# Patient Record
Sex: MALE | Race: WHITE | NOT HISPANIC OR LATINO | Employment: FULL TIME | URBAN - NONMETROPOLITAN AREA
[De-identification: names, ages, dates, MRNs, and addresses within clinical notes are randomized per-mention and may not be internally consistent; named-entity substitution may affect disease eponyms.]

---

## 2024-12-11 ENCOUNTER — OFFICE VISIT (OUTPATIENT)
Dept: NEUROLOGY | Facility: CLINIC | Age: 49
End: 2024-12-11
Payer: MEDICAID

## 2024-12-11 VITALS — BODY MASS INDEX: 33.53 KG/M2 | WEIGHT: 247.2 LBS

## 2024-12-11 DIAGNOSIS — H83.2X3 VESTIBULAR HYPOFUNCTION OF BOTH EARS: Primary | ICD-10-CM

## 2024-12-11 PROCEDURE — 99215 OFFICE O/P EST HI 40 MIN: CPT | Performed by: STUDENT IN AN ORGANIZED HEALTH CARE EDUCATION/TRAINING PROGRAM

## 2024-12-11 NOTE — PROGRESS NOTES
"Subjective     Alirio Cesar is a 49 y.o. year old male here for dizziness    HPI  Please see my old note for more details. Mr. Cesar has returned for a follow-up after six years regarding his vertigo and difficulty focusing. Recently, he has experienced several new symptoms, oscillopsia is much worse. He does not perceive double vision, he suspects the images might be overlapping, but not always, when he moves head its ok..  Mr. Cesar was initially referred by Dr. Carlos and Shaila Anthony due to a possible central cause of his dizziness. An EMP indicated a central problem, and an ENG revealed bilateral weakness. He described feeling as though he were drunk, with his surroundings moving as if he were a \"bobble head,\" and he had difficulty reading.   Driving is ok in general except there is a bouncy ride.    Review of Systems as noted in HPI    There is no problem list on file for this patient.    Past Medical History:   Diagnosis Date    Fibromyalgia 01/06/2020    Fibromyalgia    Personal history of diseases of the skin and subcutaneous tissue 11/20/2018    History of folliculitis    Personal history of other diseases of the digestive system 01/06/2020    History of small bowel obstruction    Personal history of other infectious and parasitic diseases 08/07/2018    History of Clostridium difficile infection    Personal history of other mental and behavioral disorders     History of depression     Past Surgical History:   Procedure Laterality Date    OTHER SURGICAL HISTORY  01/06/2020    Appendectomy     Social History     Tobacco Use    Smoking status: Not on file    Smokeless tobacco: Not on file   Substance Use Topics    Alcohol use: Not on file     family history is not on file.  No current outpatient medications on file.  No Known Allergies    Objective   Alert, oriented x4, EOM full, no nystagmus on straight or eccentric gaze. No head position dependent nystagmus. There is VOR hypofunction and it is " bilateral on horizontal head impulses. Pupils reactive and VF full.  Face symmetric, face sensations intact, hearing is intact bilaterally.  Power intact in all four extremities, proximal and distal.  Sensations intact bilaterally in UE and LE proximal and distal, with touch, tactile, and sharps, JPS unremarkable.  Gait requires a stick for assist, its cautious, but not ataxia.  No tremor, no increase in tone.  Assessment/Plan   The patient is a 49 year old man with history of vestibular hypofunction (bilateral), here for follow up. Now hypofunction is more consisten on head impulses. He is inquiring about vestibular prosthesis, I will help him inquire with our colleagues in [place].  Will follow through in 3 months.

## 2025-01-08 ENCOUNTER — APPOINTMENT (OUTPATIENT)
Dept: NEUROLOGY | Facility: CLINIC | Age: 50
End: 2025-01-08

## 2025-04-09 ENCOUNTER — APPOINTMENT (OUTPATIENT)
Dept: NEUROLOGY | Facility: CLINIC | Age: 50
End: 2025-04-09
Payer: MEDICAID

## 2025-04-09 VITALS
SYSTOLIC BLOOD PRESSURE: 155 MMHG | BODY MASS INDEX: 33.58 KG/M2 | WEIGHT: 247.6 LBS | TEMPERATURE: 97.9 F | DIASTOLIC BLOOD PRESSURE: 98 MMHG | HEART RATE: 70 BPM | RESPIRATION RATE: 16 BRPM

## 2025-04-09 DIAGNOSIS — H83.2X3 VESTIBULAR HYPOFUNCTION OF BOTH EARS: Primary | ICD-10-CM

## 2025-04-09 PROCEDURE — 99215 OFFICE O/P EST HI 40 MIN: CPT | Performed by: STUDENT IN AN ORGANIZED HEALTH CARE EDUCATION/TRAINING PROGRAM

## 2025-04-09 RX ORDER — BUSPIRONE HYDROCHLORIDE 15 MG/1
15 TABLET ORAL NIGHTLY
COMMUNITY

## 2025-04-09 RX ORDER — PAROXETINE 10 MG/1
10 TABLET, FILM COATED ORAL DAILY
COMMUNITY

## 2025-04-09 RX ORDER — LOSARTAN POTASSIUM 50 MG/1
50 TABLET ORAL
COMMUNITY
Start: 2025-03-25 | End: 2026-03-25

## 2025-04-09 RX ORDER — SILDENAFIL CITRATE 20 MG/1
40 TABLET ORAL AS NEEDED
COMMUNITY
Start: 2025-01-15

## 2025-04-09 RX ORDER — AMLODIPINE BESYLATE 5 MG/1
TABLET ORAL
COMMUNITY

## 2025-04-09 RX ORDER — LANOLIN ALCOHOL/MO/W.PET/CERES
1000 CREAM (GRAM) TOPICAL
COMMUNITY
Start: 2025-03-25 | End: 2026-03-25

## 2025-04-09 RX ORDER — CHOLECALCIFEROL (VITAMIN D3) 50 MCG
2000 TABLET ORAL
COMMUNITY
Start: 2025-03-25

## 2025-04-09 RX ORDER — FLUTICASONE PROPIONATE 50 MCG
1 SPRAY, SUSPENSION (ML) NASAL 2 TIMES DAILY
COMMUNITY

## 2025-04-09 ASSESSMENT — PAIN SCALES - GENERAL: PAINLEVEL_OUTOF10: 0-NO PAIN

## 2025-04-09 NOTE — PROGRESS NOTES
"Subjective     Alirio Cesar is a 50 y.o. year old male here for dizziness    HPI  Interval Hx  Ambulates with cane when inside building or in the dark. Otherwise does not need a cane. States symptoms have not change since last visit. Had PT years ago and reports it does not work for him. Reports he is here to find out if Dr. Sanders was bale to reach out to providers that can do the vestibular implants.     Endorses anxiety and depression and is on medication for those, denies SI/HI.    Prior hx  Please see my old note for more details. Mr. Cesar has returned for a follow-up after six years regarding his vertigo and difficulty focusing. Recently, he has experienced several new symptoms, oscillopsia is much worse. He does not perceive double vision, he suspects the images might be overlapping, but not always, when he moves head its ok..  Mr. Cesar was initially referred by Dr. Carlos and Shaila Anthony due to a possible central cause of his dizziness. An EMP indicated a central problem, and an ENG revealed bilateral weakness. He described feeling as though he were drunk, with his surroundings moving as if he were a \"bobble head,\" and he had difficulty reading.   Driving is ok in general except there is a bouncy ride.    Review of Systems as noted in HPI    There is no problem list on file for this patient.    Past Medical History:   Diagnosis Date    Fibromyalgia 01/06/2020    Fibromyalgia    Personal history of diseases of the skin and subcutaneous tissue 11/20/2018    History of folliculitis    Personal history of other diseases of the digestive system 01/06/2020    History of small bowel obstruction    Personal history of other infectious and parasitic diseases 08/07/2018    History of Clostridium difficile infection    Personal history of other mental and behavioral disorders     History of depression     Past Surgical History:   Procedure Laterality Date    OTHER SURGICAL HISTORY  01/06/2020    Appendectomy "     Social History     Tobacco Use    Smoking status: Not on file    Smokeless tobacco: Not on file   Substance Use Topics    Alcohol use: Not on file     family history is not on file.    Current Outpatient Medications:     cholecalciferol (Vitamin D-3) 50 mcg (2,000 units) tablet, Take 1 tablet (2,000 Units) by mouth once daily., Disp: , Rfl:     cyanocobalamin (Vitamin B-12) 1,000 mcg tablet, Take 1 tablet (1,000 mcg) by mouth once daily., Disp: , Rfl:     losartan (Cozaar) 50 mg tablet, Take 1 tablet (50 mg) by mouth once daily., Disp: , Rfl:     sildenafil (Revatio) 20 mg tablet, Take 2 tablets (40 mg) by mouth if needed., Disp: , Rfl:     amLODIPine (Norvasc) 5 mg tablet, TAKE 1 TABLET BY MOUTH DAILY AT BEDTIME FOR HIGH BLOOD PRESSURE, Disp: , Rfl:     busPIRone (Buspar) 15 mg tablet, Take 1 tablet (15 mg) by mouth once daily at bedtime., Disp: , Rfl:     fluticasone (Flonase) 50 mcg/actuation nasal spray, Administer 1 spray into each nostril 2 times a day., Disp: , Rfl:     PARoxetine (Paxil) 10 mg tablet, Take 1 tablet (10 mg) by mouth once daily., Disp: , Rfl:   No Known Allergies    Objective   Alert, oriented x4, EOM full, no nystagmus on straight or eccentric gaze. No head position dependent nystagmus. There is VOR hypofunction and it is bilateral on horizontal head impulses. Pupils reactive and VF full.  Face symmetric, face sensations intact, hearing is intact bilaterally.  Power intact in all four extremities, proximal and distal.  Sensations intact bilaterally in UE and LE proximal and distal, with touch, tactile, and sharps, JPS unremarkable.  Gait requires a stick for assist, its cautious, but not ataxia.  No tremor, no increase in tone.    Assessment/Plan   The patient is a 50 y.o.  man with history of vestibular hypofunction (bilateral), here for follow up. There is clear hypofunction on head impulses. Symptoms unchanged since last visit. We provided reply from providers doing research on  vestibular implants. Patient to reach out directly to provider for clinical trial.    Will follow through in 1 year.

## 2025-04-16 ENCOUNTER — APPOINTMENT (OUTPATIENT)
Dept: NEUROLOGY | Facility: CLINIC | Age: 50
End: 2025-04-16
Payer: MEDICAID

## 2025-06-17 ENCOUNTER — APPOINTMENT (OUTPATIENT)
Dept: PRIMARY CARE | Facility: CLINIC | Age: 50
End: 2025-06-17
Payer: MEDICAID

## 2025-06-17 VITALS
BODY MASS INDEX: 31.53 KG/M2 | WEIGHT: 232.8 LBS | HEART RATE: 69 BPM | HEIGHT: 72 IN | DIASTOLIC BLOOD PRESSURE: 83 MMHG | OXYGEN SATURATION: 96 % | TEMPERATURE: 98 F | SYSTOLIC BLOOD PRESSURE: 125 MMHG

## 2025-06-17 DIAGNOSIS — Z76.89 ENCOUNTER TO ESTABLISH CARE: Primary | ICD-10-CM

## 2025-06-17 DIAGNOSIS — R79.89 LOW VITAMIN B12 LEVEL: ICD-10-CM

## 2025-06-17 DIAGNOSIS — I10 PRIMARY HYPERTENSION: ICD-10-CM

## 2025-06-17 DIAGNOSIS — R79.89 ELEVATED LFTS: ICD-10-CM

## 2025-06-17 DIAGNOSIS — E55.9 VITAMIN D DEFICIENCY: ICD-10-CM

## 2025-06-17 DIAGNOSIS — H83.2X3 VESTIBULAR HYPOFUNCTION OF BOTH EARS: ICD-10-CM

## 2025-06-17 DIAGNOSIS — N52.9 ERECTILE DYSFUNCTION, UNSPECIFIED ERECTILE DYSFUNCTION TYPE: ICD-10-CM

## 2025-06-17 DIAGNOSIS — F41.8 MIXED ANXIETY DEPRESSIVE DISORDER: ICD-10-CM

## 2025-06-17 DIAGNOSIS — F32.5 MAJOR DEPRESSIVE DISORDER WITH SINGLE EPISODE, IN REMISSION: ICD-10-CM

## 2025-06-17 PROBLEM — Z15.89 MTHFR MUTATION: Status: ACTIVE | Noted: 2025-06-17

## 2025-06-17 PROBLEM — H81.09 MENIERE'S SYNDROME: Status: ACTIVE | Noted: 2025-06-17

## 2025-06-17 PROCEDURE — 3008F BODY MASS INDEX DOCD: CPT | Performed by: STUDENT IN AN ORGANIZED HEALTH CARE EDUCATION/TRAINING PROGRAM

## 2025-06-17 PROCEDURE — 3079F DIAST BP 80-89 MM HG: CPT | Performed by: STUDENT IN AN ORGANIZED HEALTH CARE EDUCATION/TRAINING PROGRAM

## 2025-06-17 PROCEDURE — 3074F SYST BP LT 130 MM HG: CPT | Performed by: STUDENT IN AN ORGANIZED HEALTH CARE EDUCATION/TRAINING PROGRAM

## 2025-06-17 PROCEDURE — 99204 OFFICE O/P NEW MOD 45 MIN: CPT | Performed by: STUDENT IN AN ORGANIZED HEALTH CARE EDUCATION/TRAINING PROGRAM

## 2025-06-17 RX ORDER — CHOLECALCIFEROL (VITAMIN D3) 50 MCG
100 TABLET ORAL DAILY
Qty: 180 TABLET | Refills: 0 | Status: SHIPPED | OUTPATIENT
Start: 2025-06-17

## 2025-06-17 RX ORDER — SILDENAFIL 50 MG/1
50 TABLET, FILM COATED ORAL DAILY PRN
Qty: 90 TABLET | Refills: 0 | Status: SHIPPED | OUTPATIENT
Start: 2025-06-17

## 2025-06-17 RX ORDER — PAROXETINE 10 MG/1
10 TABLET, FILM COATED ORAL DAILY
Qty: 90 TABLET | Refills: 1 | Status: SHIPPED | OUTPATIENT
Start: 2025-06-17

## 2025-06-17 RX ORDER — LANOLIN ALCOHOL/MO/W.PET/CERES
1000 CREAM (GRAM) TOPICAL
Qty: 90 TABLET | Refills: 0 | Status: SHIPPED | OUTPATIENT
Start: 2025-06-17 | End: 2025-09-15

## 2025-06-17 ASSESSMENT — ENCOUNTER SYMPTOMS
DEPRESSION: 0
LIGHT-HEADEDNESS: 0
OCCASIONAL FEELINGS OF UNSTEADINESS: 0
ABDOMINAL PAIN: 0
SHORTNESS OF BREATH: 0
HEADACHES: 0
COUGH: 0
FEVER: 0
FATIGUE: 0
RHINORRHEA: 0
LOSS OF SENSATION IN FEET: 0
DIZZINESS: 0
CHILLS: 0

## 2025-06-17 ASSESSMENT — PATIENT HEALTH QUESTIONNAIRE - PHQ9
1. LITTLE INTEREST OR PLEASURE IN DOING THINGS: NOT AT ALL
2. FEELING DOWN, DEPRESSED OR HOPELESS: NOT AT ALL
SUM OF ALL RESPONSES TO PHQ9 QUESTIONS 1 AND 2: 0

## 2025-06-17 NOTE — PATIENT INSTRUCTIONS
Thank you for coming into getting established with me today in the office.    Overall your blood pressure was well-controlled today even with being off your medications for the last month or 2.  Please continue to keep track of blood pressures at home to make sure they do not elevate.  If you start getting elevated blood pressures, please keep track of them for about 2 to 4 weeks and then see me for blood pressure follow-up.  If that does happen, bring your blood pressure cuff with you as well as a log of blood pressures with you to that appointment.    Your vitamin D level was low and I would like you to take 2 of the capsules of the vitamin D supplement that I am sending in which would be a total of 4000 units/day.  I am also sending in for vitamin B12 and you can take that 1 tablet/day.  I do want to get repeat blood work again in about a month in the middle of July.  This can be an [facility] laboratory and we will print out the lab report for you that you can take with you.  There is a number on the last page of the blood work so that you can set up an appointment.  All  labs are run through [employer].    Please continue regular follow-up with your neurologist.    Feel to check that website for balance belt to see if you would qualify for possibly a trial.    I also went ahead and resent in the sildenafil for the erectile dysfunction.  Please let me know if you are having any issues with that medication in terms of cost.  I also went ahead and refilled your paroxetine and you can go ahead and start retaking that.  Please hold off on the buspirone for now and we can plan on restarting this at another time if needed.    We will go ahead and do a follow-up appointment in the fall for further review as well as to make sure how everything is doing.    Please call sooner with any other acute concerns or complaints.    Thank you

## 2025-06-17 NOTE — PROGRESS NOTES
Subjective   Patient ID: Alirio Cesar is a 50 y.o. male who presents for [facility].    HPI     Patient is coming in to [facility] care.  He states that he does need refills of his medications.  He was following with a doctor at the [facility] for general practitioner, but does follow with neurology with .  He states that he just had lab work done in the beginning of May with his last physicain.  Liver testing and cholesterol was elevated. Vitamin d was low.  He has not started to take supplemental vitamin D.    He stopped taking his blood pressure medications prior as he started to have some symptoms of lightheadedness.  He was not exactly sure if this was something he needed to restart right away.        Review of Systems   Constitutional:  Negative for chills, fatigue and fever.   HENT:  Negative for congestion and rhinorrhea.    Respiratory:  Negative for cough and shortness of breath.    Cardiovascular:  Negative for chest pain.   Gastrointestinal:  Negative for abdominal pain.   Neurological:  Negative for dizziness, light-headedness and headaches.       Objective   /83   Pulse 69   Temp 36.7 °C (98 °F) (Oral)   Ht 1.829 m (6')   Wt 106 kg (232 lb 12.8 oz)   SpO2 96%   BMI 31.57 kg/m²     Physical Exam  Vitals and nursing note reviewed.   Constitutional:       General: He is not in acute distress.     Appearance: Normal appearance. He is obese. He is not ill-appearing or toxic-appearing.   HENT:      Head: Normocephalic and atraumatic.   Cardiovascular:      Rate and Rhythm: Normal rate and regular rhythm.      Heart sounds: Normal heart sounds.   Pulmonary:      Effort: Pulmonary effort is normal.      Breath sounds: Normal breath sounds.   Neurological:      Mental Status: He is alert.         Assessment/Plan   Problem List Items Addressed This Visit           ICD-10-CM    Vitamin D deficiency E55.9    Most recent on labs on 5/6/2025. Will do repeat labs.         Relevant  Medications    cholecalciferol (Vitamin D-3) 50 mcg (2,000 units) tablet    Other Relevant Orders    Vitamin D 25-Hydroxy,Total (for eval of Vitamin D levels)    Vestibular hypofunction of both ears H83.2X3    Follows with  neurology with Dr. Sanders.         Primary hypertension I10    Previously on amlodipine and losartan. Blood pressure is well controlled with being off the medications for the last month. He will keep track of blood pressures at home.         Relevant Orders    Comprehensive Metabolic Panel    CBC and Auto Differential    Mixed anxiety depressive disorder F41.8    Previously on buspirone 15 mg and on paroxetine 10 mg.  He has not been on them for the last month. He still has buspirone at home and would like a refill of the paroxetine.         Relevant Medications    PARoxetine (Paxil) 10 mg tablet    Major depressive disorder with single episode, in remission F32.5    Previously on buspirone 15 mg and on paroxetine 10 mg.  He has not been on them for the last month. He still has buspirone at home and would like a refill of the paroxetine.         Relevant Medications    PARoxetine (Paxil) 10 mg tablet     Other Visit Diagnoses         Codes      Encounter to establish care    -  Primary Z76.89      Erectile dysfunction, unspecified erectile dysfunction type     N52.9    Relevant Medications    sildenafil (Viagra) 50 mg tablet      Elevated LFTs     R79.89    Relevant Orders    Comprehensive Metabolic Panel      Low vitamin B12 level     R79.89    Relevant Medications    cyanocobalamin (Vitamin B-12) 1,000 mcg tablet          History and physical examination as above.  Patient coming into establish care.  Updated some of patient's medical history in the office.  Did discuss that his blood pressure overall was really well-controlled today with him being off his medications for the last 1 to 2 months.  He will continue to keep track of blood pressures at home making sure that they do not elevate.   We did discuss that if he starts to get elevations, he can track them for about 2 to 4 weeks and then we can see him back in the office for blood pressure follow-up.  We did discuss that if that happens I would want him to bring in his blood pressure cuff as well as a list of blood pressures that he had been getting at home so we can get a better idea of how accurate his cuff is.  Reviewed results of recent lab testing with his prior primary care physician.  Vitamin D level is low and we discussed additional supplementation.  Also discussed and sent in a prescription for vitamin B12 since his level was also low as well.  Plan for repeat blood work in the middle of July and he was given lab work order sheets as well.  He will continue regular follow-up with his neurologist.  Sent in prescription for sildenafil for erectile dysfunction and he will call if he is having any issues getting a hold of this medication.  Plan for follow-up appointment in the fall for further review as well as to make sure how everything is going.  He will call sooner with any other acute concerns or complaints.

## 2025-06-17 NOTE — ASSESSMENT & PLAN NOTE
Previously on amlodipine and losartan. Blood pressure is well controlled with being off the medications for the last month. He will keep track of blood pressures at home.

## 2025-06-17 NOTE — ASSESSMENT & PLAN NOTE
Previously on buspirone 15 mg and on paroxetine 10 mg.  He has not been on them for the last month. He still has buspirone at home and would like a refill of the paroxetine.

## 2025-06-18 ENCOUNTER — TELEPHONE (OUTPATIENT)
Dept: PRIMARY CARE | Facility: CLINIC | Age: 50
End: 2025-06-18
Payer: MEDICAID

## 2025-06-18 NOTE — TELEPHONE ENCOUNTER
Glenn from [facility] called regarding pt's viagra rx. Said he has never seen instructions like that before and wants to speak to you directly. I read back what was in the computer to him because it made sense to me and he confirmed the instructions were the same so I'm not sure what the confusion is.

## 2025-07-17 DIAGNOSIS — E55.9 VITAMIN D DEFICIENCY: ICD-10-CM

## 2025-07-17 DIAGNOSIS — R79.89 ELEVATED LFTS: ICD-10-CM

## 2025-07-17 DIAGNOSIS — I10 PRIMARY HYPERTENSION: ICD-10-CM
